# Patient Record
Sex: MALE | Race: WHITE | Employment: FULL TIME | URBAN - NONMETROPOLITAN AREA
[De-identification: names, ages, dates, MRNs, and addresses within clinical notes are randomized per-mention and may not be internally consistent; named-entity substitution may affect disease eponyms.]

---

## 2023-04-03 ENCOUNTER — OFFICE VISIT (OUTPATIENT)
Dept: FAMILY MEDICINE CLINIC | Age: 28
End: 2023-04-03
Payer: COMMERCIAL

## 2023-04-03 VITALS
SYSTOLIC BLOOD PRESSURE: 136 MMHG | OXYGEN SATURATION: 98 % | BODY MASS INDEX: 30.54 KG/M2 | DIASTOLIC BLOOD PRESSURE: 86 MMHG | WEIGHT: 230.4 LBS | HEIGHT: 73 IN | HEART RATE: 93 BPM | TEMPERATURE: 98.2 F

## 2023-04-03 DIAGNOSIS — R22.1 NECK MASS: ICD-10-CM

## 2023-04-03 DIAGNOSIS — Z76.89 ENCOUNTER TO ESTABLISH CARE: Primary | ICD-10-CM

## 2023-04-03 PROCEDURE — G8427 DOCREV CUR MEDS BY ELIG CLIN: HCPCS | Performed by: FAMILY MEDICINE

## 2023-04-03 PROCEDURE — 99203 OFFICE O/P NEW LOW 30 MIN: CPT | Performed by: FAMILY MEDICINE

## 2023-04-03 PROCEDURE — G8417 CALC BMI ABV UP PARAM F/U: HCPCS | Performed by: FAMILY MEDICINE

## 2023-04-03 PROCEDURE — 1036F TOBACCO NON-USER: CPT | Performed by: FAMILY MEDICINE

## 2023-04-03 SDOH — ECONOMIC STABILITY: FOOD INSECURITY: WITHIN THE PAST 12 MONTHS, THE FOOD YOU BOUGHT JUST DIDN'T LAST AND YOU DIDN'T HAVE MONEY TO GET MORE.: NEVER TRUE

## 2023-04-03 SDOH — ECONOMIC STABILITY: HOUSING INSECURITY
IN THE LAST 12 MONTHS, WAS THERE A TIME WHEN YOU DID NOT HAVE A STEADY PLACE TO SLEEP OR SLEPT IN A SHELTER (INCLUDING NOW)?: NO

## 2023-04-03 SDOH — ECONOMIC STABILITY: FOOD INSECURITY: WITHIN THE PAST 12 MONTHS, YOU WORRIED THAT YOUR FOOD WOULD RUN OUT BEFORE YOU GOT MONEY TO BUY MORE.: NEVER TRUE

## 2023-04-03 SDOH — ECONOMIC STABILITY: INCOME INSECURITY: HOW HARD IS IT FOR YOU TO PAY FOR THE VERY BASICS LIKE FOOD, HOUSING, MEDICAL CARE, AND HEATING?: NOT HARD AT ALL

## 2023-04-03 ASSESSMENT — PATIENT HEALTH QUESTIONNAIRE - PHQ9
2. FEELING DOWN, DEPRESSED OR HOPELESS: 0
SUM OF ALL RESPONSES TO PHQ QUESTIONS 1-9: 0
SUM OF ALL RESPONSES TO PHQ9 QUESTIONS 1 & 2: 0
SUM OF ALL RESPONSES TO PHQ QUESTIONS 1-9: 0
1. LITTLE INTEREST OR PLEASURE IN DOING THINGS: 0

## 2023-04-03 ASSESSMENT — ENCOUNTER SYMPTOMS
DIARRHEA: 0
COLOR CHANGE: 0
CONSTIPATION: 0
RHINORRHEA: 0
COUGH: 0
FACIAL SWELLING: 0
SINUS PAIN: 0
BLOOD IN STOOL: 0
VOMITING: 0
ABDOMINAL DISTENTION: 0
SINUS PRESSURE: 0
CHEST TIGHTNESS: 0
SHORTNESS OF BREATH: 0
APNEA: 0
PHOTOPHOBIA: 0

## 2023-04-03 NOTE — PROGRESS NOTES
motion. Cervical back: Normal range of motion. Skin:     General: Skin is warm and dry. Neurological:      General: No focal deficit present. Mental Status: He is alert and oriented to person, place, and time. Psychiatric:         Mood and Affect: Mood normal.       Assessment & Plan:   Julieta Alvarez was seen today for new patient. Diagnoses and all orders for this visit:    Encounter to establish care    Neck mass  -     US HEAD NECK SOFT TISSUE THYROID; Future      Healthy 29 y/o. Neck mass clinical sx is that of cystic lesion. Will obtain US. Discussed possible excisional removal, with either me or Gen surgery, depending on US evaluation       Follow Up:   No follow-ups on file. Counseled regarding above diagnosis, including possible risks and complications,  especially if left uncontrolled. Counseled regarding the possible side effects, risks,benefits and alternatives to treatment; patient and/or guardian verbalizes understanding, agrees, feels comfortable with and wishes to proceed with above treatment plan. Advised patient to call with any newmedication issues, and read all Rx info from pharmacy to assure aware of all possible risks and side effects of medication before taking. Patient and/or guardian verbalizes understanding and agrees with above counseling, assessment and plan. All questions answered.

## 2023-10-24 ENCOUNTER — TELEPHONE (OUTPATIENT)
Dept: FAMILY MEDICINE CLINIC | Age: 28
End: 2023-10-24

## 2023-10-24 NOTE — TELEPHONE ENCOUNTER
----- Message from Janee Closs sent at 10/24/2023  9:17 AM EDT -----  Subject: Appointment Request    Reason for Call: Established Patient Appointment needed: Routine Physical   Exam    QUESTIONS    Reason for appointment request? No appointments available during search     Additional Information for Provider? Pt is calling stating he needed to   reschedule is physical on 4/4/23 for another date. I am not able to find   anything after this date.  Monday's are better for the pt.  ---------------------------------------------------------------------------  --------------  Lionel LIMA  [phone]; OK to leave message on voicemail,OK to respond with electronic   message via [employer] portal (only for patients who have registered [employer]   account)  ---------------------------------------------------------------------------  --------------  SCRIPT ANSWERS

## 2024-04-07 ASSESSMENT — PATIENT HEALTH QUESTIONNAIRE - PHQ9
SUM OF ALL RESPONSES TO PHQ QUESTIONS 1-9: 0
SUM OF ALL RESPONSES TO PHQ9 QUESTIONS 1 & 2: 0
2. FEELING DOWN, DEPRESSED OR HOPELESS: NOT AT ALL
SUM OF ALL RESPONSES TO PHQ QUESTIONS 1-9: 0
SUM OF ALL RESPONSES TO PHQ QUESTIONS 1-9: 0
2. FEELING DOWN, DEPRESSED OR HOPELESS: NOT AT ALL
1. LITTLE INTEREST OR PLEASURE IN DOING THINGS: NOT AT ALL
SUM OF ALL RESPONSES TO PHQ QUESTIONS 1-9: 0
SUM OF ALL RESPONSES TO PHQ9 QUESTIONS 1 & 2: 0
1. LITTLE INTEREST OR PLEASURE IN DOING THINGS: NOT AT ALL

## 2024-04-08 ENCOUNTER — OFFICE VISIT (OUTPATIENT)
Dept: FAMILY MEDICINE CLINIC | Age: 29
End: 2024-04-08
Payer: COMMERCIAL

## 2024-04-08 VITALS
BODY MASS INDEX: 29.29 KG/M2 | DIASTOLIC BLOOD PRESSURE: 82 MMHG | OXYGEN SATURATION: 98 % | SYSTOLIC BLOOD PRESSURE: 110 MMHG | WEIGHT: 221 LBS | TEMPERATURE: 98.2 F | HEIGHT: 73 IN | HEART RATE: 86 BPM

## 2024-04-08 DIAGNOSIS — Z00.00 ENCOUNTER FOR WELL ADULT EXAM WITHOUT ABNORMAL FINDINGS: Primary | ICD-10-CM

## 2024-04-08 DIAGNOSIS — Z00.00 ANNUAL PHYSICAL EXAM: ICD-10-CM

## 2024-04-08 DIAGNOSIS — D17.0 LIPOMA OF NECK: ICD-10-CM

## 2024-04-08 LAB
ALBUMIN SERPL-MCNC: 4.8 G/DL (ref 3.5–5.2)
ALP BLD-CCNC: 64 U/L (ref 40–129)
ALT SERPL-CCNC: 21 U/L (ref 0–40)
ANION GAP SERPL CALCULATED.3IONS-SCNC: 14 MMOL/L (ref 7–16)
AST SERPL-CCNC: 23 U/L (ref 0–39)
BASOPHILS ABSOLUTE: 0.01 K/UL (ref 0–0.2)
BASOPHILS RELATIVE PERCENT: 0 % (ref 0–2)
BILIRUB SERPL-MCNC: 0.3 MG/DL (ref 0–1.2)
BUN BLDV-MCNC: 15 MG/DL (ref 6–20)
CALCIUM SERPL-MCNC: 9.9 MG/DL (ref 8.6–10.2)
CHLORIDE BLD-SCNC: 103 MMOL/L (ref 98–107)
CHOLESTEROL: 174 MG/DL
CO2: 25 MMOL/L (ref 22–29)
CREAT SERPL-MCNC: 1 MG/DL (ref 0.7–1.2)
EOSINOPHILS ABSOLUTE: 0.04 K/UL (ref 0.05–0.5)
EOSINOPHILS RELATIVE PERCENT: 1 % (ref 0–6)
GFR SERPL CREATININE-BSD FRML MDRD: >90 ML/MIN/1.73M2
GLUCOSE BLD-MCNC: 98 MG/DL (ref 74–99)
HCT VFR BLD CALC: 47.7 % (ref 37–54)
HDLC SERPL-MCNC: 50 MG/DL
HEMOGLOBIN: 15.3 G/DL (ref 12.5–16.5)
IMMATURE GRANULOCYTES %: 0 % (ref 0–5)
IMMATURE GRANULOCYTES ABSOLUTE: <0.03 K/UL (ref 0–0.58)
LDL CHOLESTEROL: 116 MG/DL
LYMPHOCYTES ABSOLUTE: 1.23 K/UL (ref 1.5–4)
LYMPHOCYTES RELATIVE PERCENT: 19 % (ref 20–42)
MCH RBC QN AUTO: 28.5 PG (ref 26–35)
MCHC RBC AUTO-ENTMCNC: 32.1 G/DL (ref 32–34.5)
MCV RBC AUTO: 89 FL (ref 80–99.9)
MONOCYTES ABSOLUTE: 0.39 K/UL (ref 0.1–0.95)
MONOCYTES RELATIVE PERCENT: 6 % (ref 2–12)
NEUTROPHILS ABSOLUTE: 4.96 K/UL (ref 1.8–7.3)
NEUTROPHILS RELATIVE PERCENT: 75 % (ref 43–80)
PDW BLD-RTO: 14.2 % (ref 11.5–15)
PLATELET # BLD: 158 K/UL (ref 130–450)
PMV BLD AUTO: 12.1 FL (ref 7–12)
POTASSIUM SERPL-SCNC: 5 MMOL/L (ref 3.5–5)
RBC # BLD: 5.36 M/UL (ref 3.8–5.8)
SODIUM BLD-SCNC: 142 MMOL/L (ref 132–146)
TOTAL PROTEIN: 7.4 G/DL (ref 6.4–8.3)
TRIGL SERPL-MCNC: 42 MG/DL
TSH SERPL DL<=0.05 MIU/L-ACNC: 1.77 UIU/ML (ref 0.27–4.2)
VITAMIN D 25-HYDROXY: 18 NG/ML (ref 30–100)
VLDLC SERPL CALC-MCNC: 8 MG/DL
WBC # BLD: 6.7 K/UL (ref 4.5–11.5)

## 2024-04-08 PROCEDURE — 99395 PREV VISIT EST AGE 18-39: CPT | Performed by: FAMILY MEDICINE

## 2024-04-08 ASSESSMENT — ENCOUNTER SYMPTOMS
CONSTIPATION: 0
FACIAL SWELLING: 0
PHOTOPHOBIA: 0
APNEA: 0
CHEST TIGHTNESS: 0
DIARRHEA: 0
COUGH: 0
SINUS PAIN: 0
BLOOD IN STOOL: 0
COLOR CHANGE: 0
SINUS PRESSURE: 0
RHINORRHEA: 0
ABDOMINAL DISTENTION: 0
SHORTNESS OF BREATH: 0
VOMITING: 0

## 2024-04-08 NOTE — PROGRESS NOTES
Shravan  2. Annual physical exam  -     Vitamin D 25 Hydroxy; Future  -     Lipid Panel; Future  -     Comprehensive Metabolic Panel; Future  -     TSH; Future  -     CBC with Auto Differential; Future         Personalized Preventive Plan   Current Health Maintenance Status    There is no immunization history on file for this patient.     Health Maintenance   Topic Date Due    Hepatitis B vaccine (1 of 3 - 3-dose series) Never done    COVID-19 Vaccine (1) Never done    Varicella vaccine (2 of 2 - 2-dose childhood series) 02/11/2000    HIV screen  Never done    Hepatitis C screen  Never done    DTaP/Tdap/Td vaccine (2 - Tdap) 08/01/2013    Flu vaccine (Season Ended) 08/01/2024    Depression Screen  04/07/2025    Hepatitis A vaccine  Aged Out    Hib vaccine  Aged Out    HPV vaccine  Aged Out    Polio vaccine  Aged Out    Meningococcal (ACWY) vaccine  Aged Out    Pneumococcal 0-64 years Vaccine  Aged Out     Recommendations for Preventive Services Due: see orders and patient instructions/AVS.    No follow-ups on file.

## 2024-04-08 NOTE — PATIENT INSTRUCTIONS
Advance Care Planning     Advance Care Planning opens a door to talk about and write down your wishes before a sudden accident or illness.  Make your goals, values, and preferences known.     This puts you in the ’s seat and helps others know what matters most to you so they won’t have to guess.      Where can you learn more?    Go to [email or web address]   to learn how to:    Name someone you trust to make healthcare decisions for you, only if you can’t. (Healthcare Power of )    Document your wishes for care if you were seriously ill and not expected to recover or are approaching end of life. (Advance Directive or Living Will)    The same page can be found using the QR code below.                Well Visit, Ages 18 to 65: Care Instructions  Well visits can help you stay healthy. Your doctor has checked your overall health and may have suggested ways to take good care of yourself. Your doctor also may have recommended tests. You can help prevent illness with healthy eating, good sleep, vaccinations, regular exercise, and other steps.    Get the tests that you and your doctor decide on. Depending on your age and risks, examples might include screening for diabetes; hepatitis C; HIV; and cervical, breast, lung, and colon cancer. Screening helps find diseases before any symptoms appear.   Eat healthy foods. Choose fruits, vegetables, whole grains, lean protein, and low-fat dairy foods. Limit saturated fat and reduce salt.     Limit alcohol. Men should have no more than 2 drinks a day. Women should have no more than 1. For some people, no alcohol is the best choice.   Exercise. Get at least 30 minutes of exercise on most days of the week. Walking can be a good choice.     Reach and stay at your healthy weight. This will lower your risk for many health problems.   Take care of your mental health. Try to stay connected with friends, family, and community, and find

## 2024-04-16 ENCOUNTER — TELEPHONE (OUTPATIENT)
Dept: FAMILY MEDICINE CLINIC | Age: 29
End: 2024-04-16

## 2024-04-16 DIAGNOSIS — E55.9 VITAMIN D DEFICIENCY: Primary | ICD-10-CM

## 2024-04-16 NOTE — TELEPHONE ENCOUNTER
----- Message from Kevin Carter MD sent at 4/13/2024 10:21 AM EDT -----  Lab work WNL   Please pend 50,000 IU of Vit D for 8 weeks to me

## 2024-04-16 NOTE — TELEPHONE ENCOUNTER
Patient informed of lab results. He is agreeable to taking Vit D 50,000 IU. Rx is ready to be sent to [facility] on [address].     Lab order to recheck vit D after 8 weeks of therapy is also ready to be signed.     Last Appointment:  4/8/2024  Future Appointments   Date Time Provider Department Center   4/22/2024  8:15 AM Tip Wilson DO BD GEN SURG [facility]   4/14/2025 10:00 AM Kevin Carter MD COLUMB SWATI [facility]

## 2024-04-17 ENCOUNTER — TELEPHONE (OUTPATIENT)
Dept: FAMILY MEDICINE CLINIC | Age: 29
End: 2024-04-17

## 2024-04-17 RX ORDER — CHOLECALCIFEROL (VITAMIN D3) 1250 MCG
50000 CAPSULE ORAL DAILY
Qty: 8 CAPSULE | Refills: 0 | Status: SHIPPED | OUTPATIENT
Start: 2024-04-17

## 2024-04-17 NOTE — TELEPHONE ENCOUNTER
Pharmacist from [facility] calling about the the Vitamin D3 50,000 units a day.      She stated that the usual dose for this is once a week.      Please advise.

## 2024-04-22 ENCOUNTER — TELEPHONE (OUTPATIENT)
Dept: SURGERY | Age: 29
End: 2024-04-22

## 2024-04-22 ENCOUNTER — OFFICE VISIT (OUTPATIENT)
Dept: SURGERY | Age: 29
End: 2024-04-22
Payer: COMMERCIAL

## 2024-04-22 ENCOUNTER — PREP FOR PROCEDURE (OUTPATIENT)
Dept: SURGERY | Age: 29
End: 2024-04-22

## 2024-04-22 VITALS
BODY MASS INDEX: 29.75 KG/M2 | SYSTOLIC BLOOD PRESSURE: 120 MMHG | HEIGHT: 73 IN | HEART RATE: 66 BPM | WEIGHT: 224.5 LBS | OXYGEN SATURATION: 98 % | DIASTOLIC BLOOD PRESSURE: 63 MMHG | TEMPERATURE: 97.7 F

## 2024-04-22 DIAGNOSIS — D49.2 NEOPLASM OF SOFT TISSUE OF NECK: ICD-10-CM

## 2024-04-22 DIAGNOSIS — R22.1 NECK MASS: Primary | ICD-10-CM

## 2024-04-22 DIAGNOSIS — D49.2 NEOPLASM OF SKIN OF NECK: ICD-10-CM

## 2024-04-22 PROCEDURE — 1036F TOBACCO NON-USER: CPT | Performed by: STUDENT IN AN ORGANIZED HEALTH CARE EDUCATION/TRAINING PROGRAM

## 2024-04-22 PROCEDURE — G8419 CALC BMI OUT NRM PARAM NOF/U: HCPCS | Performed by: STUDENT IN AN ORGANIZED HEALTH CARE EDUCATION/TRAINING PROGRAM

## 2024-04-22 PROCEDURE — G8427 DOCREV CUR MEDS BY ELIG CLIN: HCPCS | Performed by: STUDENT IN AN ORGANIZED HEALTH CARE EDUCATION/TRAINING PROGRAM

## 2024-04-22 PROCEDURE — 99204 OFFICE O/P NEW MOD 45 MIN: CPT | Performed by: STUDENT IN AN ORGANIZED HEALTH CARE EDUCATION/TRAINING PROGRAM

## 2024-04-22 RX ORDER — SODIUM CHLORIDE 9 MG/ML
INJECTION, SOLUTION INTRAVENOUS PRN
OUTPATIENT
Start: 2024-04-22

## 2024-04-22 RX ORDER — SODIUM CHLORIDE 0.9 % (FLUSH) 0.9 %
5-40 SYRINGE (ML) INJECTION PRN
OUTPATIENT
Start: 2024-04-22

## 2024-04-22 RX ORDER — SODIUM CHLORIDE 0.9 % (FLUSH) 0.9 %
5-40 SYRINGE (ML) INJECTION EVERY 12 HOURS SCHEDULED
OUTPATIENT
Start: 2024-04-22

## 2024-04-22 RX ORDER — SODIUM CHLORIDE, SODIUM LACTATE, POTASSIUM CHLORIDE, CALCIUM CHLORIDE 600; 310; 30; 20 MG/100ML; MG/100ML; MG/100ML; MG/100ML
INJECTION, SOLUTION INTRAVENOUS CONTINUOUS
OUTPATIENT
Start: 2024-04-22

## 2024-04-22 ASSESSMENT — ENCOUNTER SYMPTOMS
CHEST TIGHTNESS: 0
COLOR CHANGE: 0
ANAL BLEEDING: 0
DIARRHEA: 0
TROUBLE SWALLOWING: 0
STRIDOR: 0
WHEEZING: 0
SHORTNESS OF BREATH: 0
BLOOD IN STOOL: 0
APNEA: 0
COUGH: 0
NAUSEA: 0
CHOKING: 0
ABDOMINAL DISTENTION: 0
CONSTIPATION: 0
VOMITING: 0
ABDOMINAL PAIN: 0

## 2024-04-22 NOTE — TELEPHONE ENCOUNTER
Prior Authorization Form:      DEMOGRAPHICS:                     Patient Name:  Jason Silvestre  Patient :  1995            Insurance:  Payor: [employer] / Plan: [employer] PLU / Product Type: *No Product type* /   Insurance ID Number:    Payer/Plan Subscr  Sex Relation Sub. Ins. ID Effective Group Num   1. [facility]* JASON SILVESTRE* 1995 Male Self [id number]                                   [id number]         DIAGNOSIS & PROCEDURE:                       Procedure/Operation: Excision neck soft tissue neoplasm, 7 cm           CPT Code: 02134    Diagnosis:  Neck soft tissue neoplasm, 7 cm    ICD10 Code: D49.2    Location:  [place]    Surgeon:  Dr Wilson    SCHEDULING INFORMATION:                          Date: 24    Time: 8:30am              Anesthesia:  MAC/TIVA                                                       Status:  Outpatient        Special Comments:  N/A       Electronically signed by CONI GRANDE MA on 2024 at 12:24 PM

## 2024-04-22 NOTE — TELEPHONE ENCOUNTER
Scheduled patient for Excision neck soft tissue neoplasm, 7 cm on 6/11/2 @ 8:30am at [place]. Patient needs to report at the front entrance 2 hours before the procedure, NPO after the midnight the night before the procedure. No ASA products for 5 days. Patient verbalized understanding. Instruction letter mailed. Encouraged to call our office if any questions.    Electronically signed by CONI GRANDE MA on 4/22/2024 at 12:26 PM

## 2024-04-22 NOTE — PROGRESS NOTES
Age of Onset    Stroke Father     Diabetes Father     Dementia Father        Review of Systems   Constitutional:  Negative for activity change, appetite change, chills, diaphoresis, fatigue, fever and unexpected weight change.   HENT:  Negative for trouble swallowing.    Respiratory:  Negative for apnea, cough, choking, chest tightness, shortness of breath, wheezing and stridor.    Cardiovascular:  Negative for chest pain.   Gastrointestinal:  Negative for abdominal distention, abdominal pain, anal bleeding, blood in stool, constipation, diarrhea, nausea and vomiting.   Musculoskeletal:  Positive for neck pain. Negative for arthralgias and myalgias.   Skin:  Negative for color change, pallor and wound.   Neurological:  Negative for dizziness and light-headedness.   Psychiatric/Behavioral:  Negative for agitation and confusion.                Objective:  Vitals:    04/22/24 0755   BP: 120/63   Pulse: 66   Temp: 97.7 °F (36.5 °C)   TempSrc: Temporal   SpO2: 98%   Weight: 101.8 kg (224 lb 8 oz)   Height: 1.854 m (6' 1\")          Physical Exam  Constitutional:       General: He is not in acute distress.     Appearance: Normal appearance. He is normal weight. He is not ill-appearing, toxic-appearing or diaphoretic.   HENT:      Head: Normocephalic and atraumatic.      Mouth/Throat:      Mouth: Mucous membranes are moist.      Pharynx: Oropharynx is clear.   Eyes:      General: No scleral icterus.     Conjunctiva/sclera: Conjunctivae normal.      Pupils: Pupils are equal, round, and reactive to light.   Neck:      Comments: Large 7cm mobile neck mass  Cardiovascular:      Rate and Rhythm: Normal rate and regular rhythm.      Pulses: Normal pulses.   Pulmonary:      Effort: Pulmonary effort is normal. No respiratory distress.   Abdominal:      General: Abdomen is flat. There is no distension.      Palpations: Abdomen is soft. There is no mass.      Tenderness: There is no abdominal tenderness. There is no guarding or

## 2024-06-06 RX ORDER — ACETAMINOPHEN 500 MG
500 TABLET ORAL EVERY 6 HOURS PRN
COMMUNITY

## 2024-06-06 NOTE — PROGRESS NOTES
[facility] PRE-ADMISSION TESTING INSTRUCTIONS    The Preadmission Testing patient is instructed accordingly using the following criteria (check applicable):    ARRIVAL INSTRUCTIONS:  [x] Parking the day of Surgery is located in the Main Entrance lot.  Upon entering the door, make an immediate right to the surgery reception desk    [x] Bring photo ID and insurance card    [] Bring in a copy of Living will or Durable Power of  papers.    [x] Please be sure to arrange for a responsible adult to provide transportation to and from the hospital    [x] Please arrange for a responsible adult to be with you for the 24 hour period post procedure due to having anesthesia    [x] If you awake am of surgery not feeling well or have temperature >100 please call [phone]    GENERAL INSTRUCTIONS:    [x] No solid foods after midnight, may have up to 8oz of water until 4 hours prior to surgery. No gum, no candy, no mints. NPO time: 0445       [x] You may brush your teeth, do not swallow any toothpaste    [x] Take medications as instructed     [x] Stop herbal supplements and vitamins 5 days prior to procedure    [x] Follow preop dosing of blood thinners per physician instructions    [] Take 1/2 dose of evening insulin, but no insulin after midnight    [] No oral diabetic medications after midnight    [] If diabetic and have low blood sugar or feel symptomatic, take 1-2oz apple juice only    [] Bring inhalers day of surgery    [] Bring urine specimen day of surgery    [x] Shower or bath with soap, lather and rinse well, AM of Surgery, no lotion, powders or creams to surgical site    [] Follow bowel prep as instructed per surgeon    [x] No tobacco products within 24 hours of surgery     [x] No alcohol or illegal drug use, marijuana included, within 24 hours of surgery.    [x] Jewelry, body piercing's, eyeglasses, contact lenses and dentures are not permitted into surgery (bring cases)      [x]

## 2024-06-08 DIAGNOSIS — E55.9 VITAMIN D DEFICIENCY: ICD-10-CM

## 2024-06-08 NOTE — TELEPHONE ENCOUNTER
Patient completed 8 weeks of Vit D 50,000 Ius. Do you want him to recheck his Vit D level prior to sending additional refills?

## 2024-06-10 RX ORDER — CHOLECALCIFEROL (VITAMIN D3) 1250 MCG
50000 CAPSULE ORAL WEEKLY
Qty: 4 CAPSULE | Refills: 1 | Status: SHIPPED | OUTPATIENT
Start: 2024-06-10

## 2024-06-11 ENCOUNTER — HOSPITAL ENCOUNTER (OUTPATIENT)
Age: 29
Setting detail: OUTPATIENT SURGERY
Discharge: HOME | End: 2024-06-11
Attending: STUDENT IN AN ORGANIZED HEALTH CARE EDUCATION/TRAINING PROGRAM | Admitting: STUDENT IN AN ORGANIZED HEALTH CARE EDUCATION/TRAINING PROGRAM
Payer: COMMERCIAL

## 2024-06-11 ENCOUNTER — ANESTHESIA EVENT (OUTPATIENT)
Dept: OPERATING ROOM | Age: 29
End: 2024-06-11
Payer: COMMERCIAL

## 2024-06-11 ENCOUNTER — ANESTHESIA (OUTPATIENT)
Dept: OPERATING ROOM | Age: 29
End: 2024-06-11
Payer: COMMERCIAL

## 2024-06-11 VITALS
SYSTOLIC BLOOD PRESSURE: 106 MMHG | DIASTOLIC BLOOD PRESSURE: 59 MMHG | RESPIRATION RATE: 16 BRPM | WEIGHT: 210 LBS | TEMPERATURE: 97.8 F | HEART RATE: 64 BPM | BODY MASS INDEX: 27.83 KG/M2 | HEIGHT: 73 IN | OXYGEN SATURATION: 99 %

## 2024-06-11 DIAGNOSIS — D49.2 NEOPLASM OF SKIN OF NECK: ICD-10-CM

## 2024-06-11 DIAGNOSIS — G89.18 POST-OPERATIVE PAIN: Primary | ICD-10-CM

## 2024-06-11 PROBLEM — D21.0 BENIGN NEOPLASM OF SOFT TISSUE OF NECK: Status: ACTIVE | Noted: 2024-06-11

## 2024-06-11 PROCEDURE — 6360000002 HC RX W HCPCS

## 2024-06-11 PROCEDURE — 7100000010 HC PHASE II RECOVERY - FIRST 15 MIN: Performed by: STUDENT IN AN ORGANIZED HEALTH CARE EDUCATION/TRAINING PROGRAM

## 2024-06-11 PROCEDURE — 2500000003 HC RX 250 WO HCPCS: Performed by: STUDENT IN AN ORGANIZED HEALTH CARE EDUCATION/TRAINING PROGRAM

## 2024-06-11 PROCEDURE — 6360000002 HC RX W HCPCS: Performed by: STUDENT IN AN ORGANIZED HEALTH CARE EDUCATION/TRAINING PROGRAM

## 2024-06-11 PROCEDURE — 3700000000 HC ANESTHESIA ATTENDED CARE: Performed by: STUDENT IN AN ORGANIZED HEALTH CARE EDUCATION/TRAINING PROGRAM

## 2024-06-11 PROCEDURE — 3600000012 HC SURGERY LEVEL 2 ADDTL 15MIN: Performed by: STUDENT IN AN ORGANIZED HEALTH CARE EDUCATION/TRAINING PROGRAM

## 2024-06-11 PROCEDURE — 2580000003 HC RX 258

## 2024-06-11 PROCEDURE — 7100000011 HC PHASE II RECOVERY - ADDTL 15 MIN: Performed by: STUDENT IN AN ORGANIZED HEALTH CARE EDUCATION/TRAINING PROGRAM

## 2024-06-11 PROCEDURE — 2720000010 HC SURG SUPPLY STERILE: Performed by: STUDENT IN AN ORGANIZED HEALTH CARE EDUCATION/TRAINING PROGRAM

## 2024-06-11 PROCEDURE — 2580000003 HC RX 258: Performed by: STUDENT IN AN ORGANIZED HEALTH CARE EDUCATION/TRAINING PROGRAM

## 2024-06-11 PROCEDURE — 88304 TISSUE EXAM BY PATHOLOGIST: CPT

## 2024-06-11 PROCEDURE — 3600000002 HC SURGERY LEVEL 2 BASE: Performed by: STUDENT IN AN ORGANIZED HEALTH CARE EDUCATION/TRAINING PROGRAM

## 2024-06-11 PROCEDURE — 3700000001 HC ADD 15 MINUTES (ANESTHESIA): Performed by: STUDENT IN AN ORGANIZED HEALTH CARE EDUCATION/TRAINING PROGRAM

## 2024-06-11 PROCEDURE — 2709999900 HC NON-CHARGEABLE SUPPLY: Performed by: STUDENT IN AN ORGANIZED HEALTH CARE EDUCATION/TRAINING PROGRAM

## 2024-06-11 RX ORDER — SODIUM CHLORIDE, SODIUM LACTATE, POTASSIUM CHLORIDE, CALCIUM CHLORIDE 600; 310; 30; 20 MG/100ML; MG/100ML; MG/100ML; MG/100ML
INJECTION, SOLUTION INTRAVENOUS CONTINUOUS
Status: DISCONTINUED | OUTPATIENT
Start: 2024-06-11 | End: 2024-06-11 | Stop reason: HOSPADM

## 2024-06-11 RX ORDER — FENTANYL CITRATE 50 UG/ML
INJECTION, SOLUTION INTRAMUSCULAR; INTRAVENOUS PRN
Status: DISCONTINUED | OUTPATIENT
Start: 2024-06-11 | End: 2024-06-11 | Stop reason: SDUPTHER

## 2024-06-11 RX ORDER — SODIUM CHLORIDE 9 MG/ML
INJECTION, SOLUTION INTRAVENOUS CONTINUOUS PRN
Status: DISCONTINUED | OUTPATIENT
Start: 2024-06-11 | End: 2024-06-11 | Stop reason: SDUPTHER

## 2024-06-11 RX ORDER — BUPIVACAINE HYDROCHLORIDE AND EPINEPHRINE 2.5; 5 MG/ML; UG/ML
INJECTION, SOLUTION EPIDURAL; INFILTRATION; INTRACAUDAL; PERINEURAL PRN
Status: DISCONTINUED | OUTPATIENT
Start: 2024-06-11 | End: 2024-06-11 | Stop reason: ALTCHOICE

## 2024-06-11 RX ORDER — SODIUM CHLORIDE 0.9 % (FLUSH) 0.9 %
5-40 SYRINGE (ML) INJECTION PRN
Status: DISCONTINUED | OUTPATIENT
Start: 2024-06-11 | End: 2024-06-11 | Stop reason: HOSPADM

## 2024-06-11 RX ORDER — MIDAZOLAM HYDROCHLORIDE 1 MG/ML
INJECTION INTRAMUSCULAR; INTRAVENOUS PRN
Status: DISCONTINUED | OUTPATIENT
Start: 2024-06-11 | End: 2024-06-11 | Stop reason: SDUPTHER

## 2024-06-11 RX ORDER — OXYCODONE HYDROCHLORIDE AND ACETAMINOPHEN 5; 325 MG/1; MG/1
1 TABLET ORAL EVERY 6 HOURS PRN
Qty: 12 TABLET | Refills: 0 | Status: SHIPPED | OUTPATIENT
Start: 2024-06-11 | End: 2024-06-14

## 2024-06-11 RX ORDER — SODIUM CHLORIDE 9 MG/ML
INJECTION, SOLUTION INTRAVENOUS PRN
Status: DISCONTINUED | OUTPATIENT
Start: 2024-06-11 | End: 2024-06-11 | Stop reason: HOSPADM

## 2024-06-11 RX ORDER — SODIUM CHLORIDE 0.9 % (FLUSH) 0.9 %
5-40 SYRINGE (ML) INJECTION EVERY 12 HOURS SCHEDULED
Status: DISCONTINUED | OUTPATIENT
Start: 2024-06-11 | End: 2024-06-11 | Stop reason: HOSPADM

## 2024-06-11 RX ORDER — PROPOFOL 10 MG/ML
INJECTION, EMULSION INTRAVENOUS CONTINUOUS PRN
Status: DISCONTINUED | OUTPATIENT
Start: 2024-06-11 | End: 2024-06-11 | Stop reason: SDUPTHER

## 2024-06-11 RX ADMIN — FENTANYL CITRATE 50 MCG: 50 INJECTION, SOLUTION INTRAMUSCULAR; INTRAVENOUS at 08:37

## 2024-06-11 RX ADMIN — FENTANYL CITRATE 50 MCG: 50 INJECTION, SOLUTION INTRAMUSCULAR; INTRAVENOUS at 08:39

## 2024-06-11 RX ADMIN — SODIUM CHLORIDE: 9 INJECTION, SOLUTION INTRAVENOUS at 08:34

## 2024-06-11 RX ADMIN — WATER 2000 MG: 1 INJECTION INTRAMUSCULAR; INTRAVENOUS; SUBCUTANEOUS at 08:40

## 2024-06-11 RX ADMIN — PROPOFOL 150 MCG/KG/MIN: 10 INJECTION, EMULSION INTRAVENOUS at 08:37

## 2024-06-11 RX ADMIN — MIDAZOLAM 2 MG: 1 INJECTION INTRAMUSCULAR; INTRAVENOUS at 08:34

## 2024-06-11 ASSESSMENT — PAIN DESCRIPTION - DESCRIPTORS
DESCRIPTORS: DISCOMFORT
DESCRIPTORS: DISCOMFORT

## 2024-06-11 ASSESSMENT — PAIN - FUNCTIONAL ASSESSMENT
PAIN_FUNCTIONAL_ASSESSMENT: 0-10
PAIN_FUNCTIONAL_ASSESSMENT: ACTIVITIES ARE NOT PREVENTED
PAIN_FUNCTIONAL_ASSESSMENT: ACTIVITIES ARE NOT PREVENTED
PAIN_FUNCTIONAL_ASSESSMENT: 0-10
PAIN_FUNCTIONAL_ASSESSMENT: 0-10

## 2024-06-11 ASSESSMENT — LIFESTYLE VARIABLES: SMOKING_STATUS: 0

## 2024-06-11 NOTE — OP NOTE
Operative Note      Patient: Jason Nance  YOB: 1995  MRN: [id number]    Date of Procedure: 6/11/2024    Pre-Op Diagnosis Codes:     * Neoplasm of skin of neck [D49.2]    Post-Op Diagnosis: Same       Procedure(s):  EXCISION NECK SOFT TISSUE NEOPLASM    Surgeon(s):  Tip Wilson DO    Assistant:   Resident: Padmini Brooks MD    Anesthesia: Monitor Anesthesia Care    Estimated Blood Loss (mL): 10cc     Complications: none    Specimens:   ID Type Source Tests Collected by Time Destination   A : POSTERIOR NECK MASS Tissue Tissue SURGICAL PATHOLOGY Tip Wilson DO 6/11/2024 0910        Implants:  * No implants in log *      Drains: * No LDAs found *    Findings:  Infection Present At Time Of Surgery (PATOS) (choose all levels that have infection present):  No infection present  Other Findings: 6.5 cm soft tissue neoplasm   This procedure was not performed to treat primary cutaneous melanoma through wide local excision    Detailed Description of Procedure:   The patient was brought to the operating room and positioned supine on the OR table. Sequential compression devices were placed on the patient's lower extremities and functioning. Preoperative antibiotics were administered, 2,000 mg Ancef. Monitored anesthesia was obtained without complication as per the anesthesia record. Immediately prior to the procedure a time-out was called and the surgical checklist was reviewed and agreed upon by all present. The patient was prepped with Chloraprep and draped in the usual sterile fashion and the procedure went forth with strict aseptic technique under maximal barrier precautions.     A linear incision was marked out. The skin was infiltrated with local anesthetic. An incision was made with a 15-blade through the dermis. Bovie electrocautery was then used to the take the incision down to the subcutaneous tissue. Then with a combination of blunt and sharp dissection the mass was carefully dissected from  the capsule.The mass was found to be subfascial and carefully dissected off the muscle. The mass measured 6.5cm. The site was irrigated with sterile water. Hemostasis was then obtained with electrocautery and Luis Antonio. The fascial layer was closed with 2-0 Vicryl. Additional Luis Antonio and Surgicel power was inserted to ensure hemostasis. The skin was then closed in a two layer fashion with interrupted deep dermal 3-0 vicryl sutures and 4-0 Vicryl running subcuticular suture. Skin glue was applied.      Instrument and needle count were correct x 2.     Dr. Wilson was present for the procedure     Electronically signed by Padmini Brooks MD on 6/11/2024 at 9:36 AM      Electronically signed by Padmini Brooks MD on 6/11/2024 at 9:34 AM

## 2024-06-11 NOTE — H&P
[facility] Note          Chief Complaint   Patient presents with    New Patient       Lipoma on neck- not bothersome but wants it checked          PCP: Kevin Carter MD     HPI: Jason Nance is a 28 y.o. male who presents in consultation for neck mass. He has had this for years but has recently been growing for the past couple of months. It has gotten significantly larger in that time and is becoming painful. He denies previous infection or drainage from the area. He had an US which showed a 5cm mass likely a lipoma. Since that US it has gotten larger     Past Medical History   No past medical history on file.        Past Surgical History   No past surgical history on file.        Home Medications           Prior to Admission medications    Medication Sig Start Date End Date Taking? Authorizing Provider   vitamin D (VITAMIN D3) 53702 UNIT CAPS Take 1 capsule by mouth daily  Patient taking differently: Take 1 capsule by mouth once a week 4/17/24   Yes Kevin Carter MD            No Known Allergies     Social History   Social History            Socioeconomic History    Marital status: Single       Spouse name: None    Number of children: None    Years of education: None    Highest education level: None   Tobacco Use    Smoking status: Never       Passive exposure: Never    Smokeless tobacco: Never    Tobacco comments:       Cigar use \"once in a blue moon\"   Substance and Sexual Activity    Alcohol use: Yes       Alcohol/week: 2.0 standard drinks of alcohol       Types: 2 Cans of beer per week       Comment: occas.    Drug use: Never    Sexual activity: Yes       Partners: Female      Social Determinants of Health           Financial Resource Strain: Low Risk  (4/3/2023)     Overall Financial Resource Strain (CARDIA)      Difficulty of Paying Living Expenses: Not hard at all   Transportation Needs: Unknown (4/3/2023)     PRAPARE - Transportation      Lack of Transportation (Non-Medical): No  No respiratory distress.   Abdominal:      General: Abdomen is flat. There is no distension.      Palpations: Abdomen is soft. There is no mass.      Tenderness: There is no abdominal tenderness. There is no guarding or rebound.      Hernia: No hernia is present.   Musculoskeletal:         General: Normal range of motion.   Skin:     General: Skin is warm and dry.      Capillary Refill: Capillary refill takes less than 2 seconds.      Coloration: Skin is not jaundiced or pale.   Neurological:      General: No focal deficit present.      Mental Status: He is alert and oriented to person, place, and time.   Psychiatric:         Mood and Affect: Mood normal.         Behavior: Behavior normal.            CBC:         Lab Results   Component Value Date/Time     WBC 6.7 04/08/2024 12:07 PM     RBC 5.36 04/08/2024 12:07 PM     HGB 15.3 04/08/2024 12:07 PM     HCT 47.7 04/08/2024 12:07 PM     MCV 89.0 04/08/2024 12:07 PM     MCH 28.5 04/08/2024 12:07 PM     MCHC 32.1 04/08/2024 12:07 PM     RDW 14.2 04/08/2024 12:07 PM      04/08/2024 12:07 PM     MPV 12.1 04/08/2024 12:07 PM      CMP:          Lab Results   Component Value Date/Time      04/08/2024 12:07 PM     K 5.0 04/08/2024 12:07 PM      04/08/2024 12:07 PM     CO2 25 04/08/2024 12:07 PM     BUN 15 04/08/2024 12:07 PM     CREATININE 1.0 04/08/2024 12:07 PM     LABGLOM >90 04/08/2024 12:07 PM     GLUCOSE 98 04/08/2024 12:07 PM     PROT 7.4 04/08/2024 12:07 PM     CALCIUM 9.9 04/08/2024 12:07 PM     BILITOT 0.3 04/08/2024 12:07 PM     ALKPHOS 64 04/08/2024 12:07 PM     AST 23 04/08/2024 12:07 PM     ALT 21 04/08/2024 12:07 PM      PT/INR:  No results found for: \"PROTIME\", \"INR\"  HgBA1c:  No results found for: \"LABA1C\"  LIPASE:  No results found for: \"LIPASE\"               I have examined the patient and performed the key aspects of physical exam, and reviewed the record (including laboratory findings, results, and all pertinent radiology images, which

## 2024-06-11 NOTE — ANESTHESIA PRE PROCEDURE
found for: \"LABABO\"    Drug/Infectious Status (If Applicable):  No results found for: \"HIV\", \"HEPCAB\"    COVID-19 Screening (If Applicable): No results found for: \"COVID19\"        Anesthesia Evaluation  Patient summary reviewed   no history of anesthetic complications:   Airway: Mallampati: II  TM distance: >3 FB   Neck ROM: full  Mouth opening: > = 3 FB   Dental: normal exam         Pulmonary:Negative Pulmonary ROS breath sounds clear to auscultation      (-) not a current smoker                           Cardiovascular:Negative CV ROS            Rhythm: regular  Rate: normal           Beta Blocker:  Not on Beta Blocker         Neuro/Psych:   Negative Neuro/Psych ROS              GI/Hepatic/Renal: Neg GI/Hepatic/Renal ROS            Endo/Other:                      ROS comment: Neoplasm of skin of neck Abdominal:             Vascular: negative vascular ROS.         Other Findings:       Anesthesia Plan      MAC     ASA 1       Induction: intravenous.    MIPS: Prophylactic antiemetics administered.  Anesthetic plan and risks discussed with patient.      Plan discussed with CRNA.                Tip Lopes MD   6/11/2024

## 2024-06-11 NOTE — DISCHARGE INSTRUCTIONS
[facility] AMBULATORY PROCEDURE DISCHARGE -   You may be drowsy or lightheaded after receiving sedation or anesthesia.    A responsible person should be with you for the next 24 hours.    Please follow the instructions checked below:    DIET INSTRUCTIONS:  [x]Start with light diet and progress to your normal diet as you feel like eating. If you experience nausea or repeated episodes of vomiting which persist beyond 12-24 hours, notify your doctor.  []Other     ACTIVITY INSTRUCTIONS:  [x]Rest today. Increase activity as tolerated    [x]No heavy lifting or strenuous activity for 2 weeks   [x]No driving for 1 day or while on narcotics  []Other     WOUND/DRESSING INSTRUCTIONS:  Always ensure you and your care giver clean hands before and after caring for the wound.  [x]May shower      []May bathe      [x]Derma bond dressing-Do not apply lotion, gel, or liquid to wound while the derma bond is in place.   []Other         MEDICATION INSTRUCTIONS:    [x]Prescriptions sent with you.  Use as directed.  When taking pain medications, you may experience dizziness or drowsiness.  Do not drink alcohol or drive when taking these medications.  [x]You may take a non-prescription “headache remedy”, preferably one that does not contain aspirin.    Other Instructions:      FOLLOW-UP CARE:  [x]Call the office for follow-up appointment as needed    Call physician if they or any other problems occur:  Fever over 101°    Redness, swelling, hardness or warmth at the operative site  Unrelieved nausea     Foul smelling or cloudy drainage at the operative site   Unrelieved pain    Blood soaked dressing. (Some oozing may be normal)

## 2024-06-11 NOTE — ANESTHESIA POSTPROCEDURE EVALUATION
Department of Anesthesiology  Postprocedure Note    Patient: Jason Nance  MRN: [id number]  YOB: 1995  Date of evaluation: 6/11/2024    Procedure Summary       Date: 06/11/24 Room / Location: [address]    Anesthesia Start: 0834 Anesthesia Stop: 0929    Procedure: EXCISION NECK SOFT TISSUE NEOPLASM (Neck) Diagnosis:       Neoplasm of skin of neck      (Neoplasm of skin of neck [D49.2])    Surgeons: Tip Wilson DO Responsible Provider: Tip Lopes MD    Anesthesia Type: MAC ASA Status: 1            Anesthesia Type: MAC    Yoan Phase I: Yoan Score: 10    Yoan Phase II: Yoan Score: 10    Anesthesia Post Evaluation    Patient location during evaluation: PACU  Patient participation: complete - patient participated  Level of consciousness: awake  Airway patency: patent  Nausea & Vomiting: no nausea and no vomiting  Cardiovascular status: hemodynamically stable  Respiratory status: acceptable  Hydration status: stable  Pain management: adequate    No notable events documented.

## 2024-06-20 LAB — SURGICAL PATHOLOGY REPORT: NORMAL

## 2024-06-28 ENCOUNTER — OFFICE VISIT (OUTPATIENT)
Dept: SURGERY | Age: 29
End: 2024-06-28

## 2024-06-28 VITALS
SYSTOLIC BLOOD PRESSURE: 108 MMHG | WEIGHT: 210 LBS | DIASTOLIC BLOOD PRESSURE: 60 MMHG | OXYGEN SATURATION: 99 % | TEMPERATURE: 97.9 F | HEART RATE: 63 BPM | BODY MASS INDEX: 27.71 KG/M2

## 2024-06-28 DIAGNOSIS — Z09 POSTOPERATIVE EXAMINATION: Primary | ICD-10-CM

## 2024-06-28 PROCEDURE — 99024 POSTOP FOLLOW-UP VISIT: CPT | Performed by: STUDENT IN AN ORGANIZED HEALTH CARE EDUCATION/TRAINING PROGRAM

## 2024-06-28 NOTE — PROGRESS NOTES
Postop Progress Note    Subjective    Jason aNnce presents to the office for postop follow up. He is doing very well post neck lipoma excision. He has no pain and is doing very well    Objective    Vitals:    06/28/24 0828   BP: 108/60   Pulse: 63   Temp: 97.9 °F (36.6 °C)   SpO2: 99%     General: alert, cooperative and no distress  Incision: healing well, C/D/I    Assessment  Doing well postoperatively s/p neck lipoma excision    Plan  1. Ok to resume all activites    Electronically signed by Tip Wilson DO on 6/28/2024 at 9:48 AM

## 2025-07-14 ENCOUNTER — PATIENT MESSAGE (OUTPATIENT)
Dept: FAMILY MEDICINE CLINIC | Age: 30
End: 2025-07-14

## 2025-07-24 ENCOUNTER — OFFICE VISIT (OUTPATIENT)
Dept: FAMILY MEDICINE CLINIC | Age: 30
End: 2025-07-24
Payer: COMMERCIAL

## 2025-07-24 VITALS
WEIGHT: 224 LBS | SYSTOLIC BLOOD PRESSURE: 124 MMHG | HEART RATE: 90 BPM | BODY MASS INDEX: 29.69 KG/M2 | HEIGHT: 73 IN | TEMPERATURE: 97.5 F | DIASTOLIC BLOOD PRESSURE: 78 MMHG | OXYGEN SATURATION: 98 % | RESPIRATION RATE: 18 BRPM

## 2025-07-24 DIAGNOSIS — Z00.00 ENCOUNTER FOR WELL ADULT EXAM WITHOUT ABNORMAL FINDINGS: ICD-10-CM

## 2025-07-24 DIAGNOSIS — E55.9 VITAMIN D DEFICIENCY: Primary | ICD-10-CM

## 2025-07-24 DIAGNOSIS — E78.00 PURE HYPERCHOLESTEROLEMIA: ICD-10-CM

## 2025-07-24 PROCEDURE — 99395 PREV VISIT EST AGE 18-39: CPT | Performed by: FAMILY MEDICINE

## 2025-07-24 ASSESSMENT — PATIENT HEALTH QUESTIONNAIRE - PHQ9
SUM OF ALL RESPONSES TO PHQ QUESTIONS 1-9: 0
2. FEELING DOWN, DEPRESSED OR HOPELESS: NOT AT ALL
SUM OF ALL RESPONSES TO PHQ QUESTIONS 1-9: 0
SUM OF ALL RESPONSES TO PHQ QUESTIONS 1-9: 0
1. LITTLE INTEREST OR PLEASURE IN DOING THINGS: NOT AT ALL
SUM OF ALL RESPONSES TO PHQ QUESTIONS 1-9: 0

## 2025-07-30 DIAGNOSIS — E55.9 VITAMIN D DEFICIENCY: ICD-10-CM

## 2025-07-30 DIAGNOSIS — E78.00 PURE HYPERCHOLESTEROLEMIA: ICD-10-CM

## 2025-07-30 LAB
ALBUMIN: 4.3 G/DL (ref 3.5–5.2)
ALP BLD-CCNC: 58 U/L (ref 40–129)
ALT SERPL-CCNC: 27 U/L (ref 0–50)
ANION GAP SERPL CALCULATED.3IONS-SCNC: 12 MMOL/L (ref 7–16)
AST SERPL-CCNC: 31 U/L (ref 0–50)
BASOPHILS ABSOLUTE: 0.02 K/UL (ref 0–0.2)
BASOPHILS RELATIVE PERCENT: 0 % (ref 0–2)
BILIRUB SERPL-MCNC: 0.2 MG/DL (ref 0–1.2)
BUN BLDV-MCNC: 19 MG/DL (ref 6–20)
CALCIUM SERPL-MCNC: 9 MG/DL (ref 8.6–10)
CHLORIDE BLD-SCNC: 106 MMOL/L (ref 98–107)
CHOLESTEROL, TOTAL: 160 MG/DL
CO2: 23 MMOL/L (ref 22–29)
CREAT SERPL-MCNC: 1.1 MG/DL (ref 0.7–1.2)
EOSINOPHILS ABSOLUTE: 0.12 K/UL (ref 0.05–0.5)
EOSINOPHILS RELATIVE PERCENT: 2 % (ref 0–6)
GFR, ESTIMATED: >90 ML/MIN/1.73M2
GLUCOSE BLD-MCNC: 111 MG/DL (ref 74–99)
HCT VFR BLD CALC: 45.8 % (ref 37–54)
HDLC SERPL-MCNC: 36 MG/DL
HEMOGLOBIN: 15.6 G/DL (ref 12.5–16.5)
IMMATURE GRANULOCYTES %: 0 % (ref 0–5)
IMMATURE GRANULOCYTES ABSOLUTE: 0.03 K/UL (ref 0–0.58)
LDL CHOLESTEROL: 109 MG/DL
LYMPHOCYTES ABSOLUTE: 1.76 K/UL (ref 1.5–4)
LYMPHOCYTES RELATIVE PERCENT: 24 % (ref 20–42)
MCH RBC QN AUTO: 29.9 PG (ref 26–35)
MCHC RBC AUTO-ENTMCNC: 34.1 G/DL (ref 32–34.5)
MCV RBC AUTO: 87.9 FL (ref 80–99.9)
MONOCYTES ABSOLUTE: 0.49 K/UL (ref 0.1–0.95)
MONOCYTES RELATIVE PERCENT: 7 % (ref 2–12)
NEUTROPHILS ABSOLUTE: 4.96 K/UL (ref 1.8–7.3)
NEUTROPHILS RELATIVE PERCENT: 67 % (ref 43–80)
PDW BLD-RTO: 13.2 % (ref 11.5–15)
PLATELET # BLD: 167 K/UL (ref 130–450)
PMV BLD AUTO: 12 FL (ref 7–12)
POTASSIUM SERPL-SCNC: 4 MMOL/L (ref 3.5–5.1)
RBC # BLD: 5.21 M/UL (ref 3.8–5.8)
SODIUM BLD-SCNC: 140 MMOL/L (ref 136–145)
TOTAL PROTEIN: 6.9 G/DL (ref 6.4–8.3)
TRIGL SERPL-MCNC: 77 MG/DL
VITAMIN D 25-HYDROXY: 35.7 NG/ML (ref 30–100)
VLDLC SERPL CALC-MCNC: 15 MG/DL
WBC # BLD: 7.4 K/UL (ref 4.5–11.5)

## (undated) DEVICE — SYRINGE,CONTROL,LL,FINGER,GRIP: Brand: MEDLINE INDUSTRIES, INC.

## (undated) DEVICE — BLADE,STAINLESS-STEEL,15,STRL,DISPOSABLE: Brand: MEDLINE

## (undated) DEVICE — GOWN,SIRUS,FABRNF,L,20/CS: Brand: MEDLINE

## (undated) DEVICE — NEEDLE HYPO 25GA L1.5IN BLU POLYPR HUB S STL REG BVL STR

## (undated) DEVICE — SURGICAL PROCEDURE PACK EENT CUST

## (undated) DEVICE — TOWEL,OR,DSP,ST,BLUE,STD,6/PK,12PK/CS: Brand: MEDLINE

## (undated) DEVICE — DRAPE,LAPAROTOMY,PCH,STERILE: Brand: MEDLINE

## (undated) DEVICE — DOUBLE BASIN SET: Brand: MEDLINE INDUSTRIES, INC.

## (undated) DEVICE — BLADE ES ELASTOMERIC COAT INSUL DURABLE BEND UPTO 90DEG

## (undated) DEVICE — 4-PORT MANIFOLD: Brand: NEPTUNE 2

## (undated) DEVICE — APPLICATOR MEDICATED 26 CC SOLUTION HI LT ORNG CHLORAPREP

## (undated) DEVICE — PACK PROCEDURE SURG GEN CUST

## (undated) DEVICE — ELECTRODE PT RET AD L9FT HI MOIST COND ADH HYDRGEL CORDED

## (undated) DEVICE — COVER HNDL LT DISP

## (undated) DEVICE — GLOVE ORANGE PI 7   MSG9070

## (undated) DEVICE — TUBING SUCT 12FR MAL ALUM SHFT FN CAP VENT UNIV CONN W/ OBT

## (undated) DEVICE — AGENT HEMSTAT 3GM PURIFIED PLNT STARCH PWD ABSRB ARISTA AH

## (undated) DEVICE — AGENT HEMSTAT 3GM OXIDIZED REGENERATED CELOS ABSRB FOR CONT (ORDER MULTIPLES OF 5EA)